# Patient Record
Sex: MALE | Race: WHITE | URBAN - METROPOLITAN AREA
[De-identification: names, ages, dates, MRNs, and addresses within clinical notes are randomized per-mention and may not be internally consistent; named-entity substitution may affect disease eponyms.]

---

## 2020-01-01 ENCOUNTER — TELEPHONE ENCOUNTER (OUTPATIENT)
Dept: URBAN - METROPOLITAN AREA CLINIC 92 | Facility: CLINIC | Age: 65
End: 2020-01-01

## 2020-01-01 ENCOUNTER — TELEPHONE ENCOUNTER (OUTPATIENT)
Dept: URBAN - METROPOLITAN AREA CLINIC 40 | Facility: CLINIC | Age: 65
End: 2020-01-01

## 2020-01-01 ENCOUNTER — OUT OF OFFICE VISIT (OUTPATIENT)
Dept: URBAN - METROPOLITAN AREA MEDICAL CENTER 30 | Facility: MEDICAL CENTER | Age: 65
End: 2020-01-01
Payer: COMMERCIAL

## 2020-01-01 ENCOUNTER — OFFICE VISIT (OUTPATIENT)
Dept: URBAN - METROPOLITAN AREA CLINIC 74 | Facility: CLINIC | Age: 65
End: 2020-01-01

## 2020-01-01 ENCOUNTER — OFFICE VISIT (OUTPATIENT)
Dept: URBAN - METROPOLITAN AREA CLINIC 39 | Facility: CLINIC | Age: 65
End: 2020-01-01

## 2020-01-01 ENCOUNTER — LAB OUTSIDE AN ENCOUNTER (OUTPATIENT)
Dept: URBAN - METROPOLITAN AREA CLINIC 74 | Facility: CLINIC | Age: 65
End: 2020-01-01

## 2020-01-01 ENCOUNTER — OFFICE VISIT (OUTPATIENT)
Dept: URBAN - METROPOLITAN AREA TELEHEALTH 2 | Facility: TELEHEALTH | Age: 65
End: 2020-01-01
Payer: COMMERCIAL

## 2020-01-01 ENCOUNTER — LAB OUTSIDE AN ENCOUNTER (OUTPATIENT)
Dept: URBAN - METROPOLITAN AREA CLINIC 40 | Facility: CLINIC | Age: 65
End: 2020-01-01

## 2020-01-01 ENCOUNTER — OFFICE VISIT (OUTPATIENT)
Dept: URBAN - METROPOLITAN AREA CLINIC 74 | Facility: CLINIC | Age: 65
End: 2020-01-01
Payer: COMMERCIAL

## 2020-01-01 ENCOUNTER — TELEPHONE ENCOUNTER (OUTPATIENT)
Dept: URBAN - METROPOLITAN AREA CLINIC 74 | Facility: CLINIC | Age: 65
End: 2020-01-01

## 2020-01-01 ENCOUNTER — OFFICE VISIT (OUTPATIENT)
Dept: URBAN - METROPOLITAN AREA CLINIC 40 | Facility: CLINIC | Age: 65
End: 2020-01-01
Payer: COMMERCIAL

## 2020-01-01 ENCOUNTER — WEB ENCOUNTER (OUTPATIENT)
Dept: URBAN - METROPOLITAN AREA CLINIC 74 | Facility: CLINIC | Age: 65
End: 2020-01-01

## 2020-01-01 DIAGNOSIS — I85.00 ESOPHAGEAL VARICES: ICD-10-CM

## 2020-01-01 DIAGNOSIS — K76.6 PORTAL HYPERTENSION: ICD-10-CM

## 2020-01-01 DIAGNOSIS — K57.90 DIVERTICULOSIS: ICD-10-CM

## 2020-01-01 DIAGNOSIS — K75.81 NASH (NONALCOHOLIC STEATOHEPATITIS): ICD-10-CM

## 2020-01-01 DIAGNOSIS — D64.89 ANEMIA DUE TO OTHER CAUSE: ICD-10-CM

## 2020-01-01 DIAGNOSIS — D50.9 FE DEFICIENCY ANEMIA: ICD-10-CM

## 2020-01-01 DIAGNOSIS — R18.8 ABDOMINAL FLUID COLLECTION: ICD-10-CM

## 2020-01-01 DIAGNOSIS — K92.1 HEMATOCHEZIA: ICD-10-CM

## 2020-01-01 DIAGNOSIS — B95.62 METHICILLIN RESISTANT STAPHYLOCOCCUS AUREUS INFECTION AS THE CAUSE OF DISEASES CLASSIFIED ELSEWHERE: ICD-10-CM

## 2020-01-01 DIAGNOSIS — K31.819 ANGIODYSPLASIA OF DUODENUM: ICD-10-CM

## 2020-01-01 DIAGNOSIS — K74.69 OTHER CIRRHOSIS OF LIVER: ICD-10-CM

## 2020-01-01 DIAGNOSIS — K31.9 GASTROPATHY: ICD-10-CM

## 2020-01-01 DIAGNOSIS — K57.30 COLON, DIVERTICULOSIS: ICD-10-CM

## 2020-01-01 DIAGNOSIS — K74.69 CIRRHOSIS, CRYPTOGENIC: ICD-10-CM

## 2020-01-01 DIAGNOSIS — K29.70 GASTRITIS: ICD-10-CM

## 2020-01-01 DIAGNOSIS — K74.60 CIRRHOSIS, NONALCOHOLIC: ICD-10-CM

## 2020-01-01 DIAGNOSIS — K72.90 ENCEPHALOPATHY, HEPATIC: ICD-10-CM

## 2020-01-01 DIAGNOSIS — Z12.11 COLON CANCER SCREENING: ICD-10-CM

## 2020-01-01 DIAGNOSIS — R16.2 HEPATOSPLENOMEGALY: ICD-10-CM

## 2020-01-01 DIAGNOSIS — R18.8 ASCITES: ICD-10-CM

## 2020-01-01 DIAGNOSIS — K31.819 GAVE (GASTRIC ANTRAL VASCULAR ECTASIA): ICD-10-CM

## 2020-01-01 DIAGNOSIS — K21.9 GERD: ICD-10-CM

## 2020-01-01 DIAGNOSIS — R18.8 OTHER ASCITES: ICD-10-CM

## 2020-01-01 DIAGNOSIS — D50.9 ANEMIA, IRON DEFICIENCY: ICD-10-CM

## 2020-01-01 DIAGNOSIS — I85.10 SECONDARY ESOPHAGEAL VARICES WITHOUT BLEEDING: ICD-10-CM

## 2020-01-01 DIAGNOSIS — R16.0 HEPATOMEGALY: ICD-10-CM

## 2020-01-01 DIAGNOSIS — D46.9 MDS (MYELODYSPLASTIC SYNDROME): ICD-10-CM

## 2020-01-01 DIAGNOSIS — D63.8 ANEMIA OF CHRONIC DISEASE: ICD-10-CM

## 2020-01-01 DIAGNOSIS — E87.1 HYPONATREMIA: ICD-10-CM

## 2020-01-01 DIAGNOSIS — R19.5 ABNORMAL FECES: ICD-10-CM

## 2020-01-01 DIAGNOSIS — R16.1 SPLENOMEGALY: ICD-10-CM

## 2020-01-01 LAB
A/G RATIO: 1
ALBUMIN: 2.3
ALKALINE PHOSPHATASE: 126
ALT (SGPT): 15
AMMONIA, PLASMA: 69
AST (SGOT): 32
BASO (ABSOLUTE): 0
BASOS: 0
BILIRUBIN, TOTAL: 0.8
BUN/CREATININE RATIO: 16
BUN: 28
CALCIUM: 7.2
CARBON DIOXIDE, TOTAL: 21
CHLORIDE: 87
CREATININE: 1.7
EGFR IF AFRICN AM: 49
EGFR IF NONAFRICN AM: 42
EOS (ABSOLUTE): 0.1
EOS: 1
GLOBULIN, TOTAL: 2.2
GLUCOSE: 282
HEMATOCRIT: 15
HEMATOLOGY COMMENTS:: (no result)
HEMOGLOBIN: 4.6
IMMATURE CELLS: (no result)
IMMATURE GRANS (ABS): 0
IMMATURE GRANULOCYTES: 1
LYMPHS (ABSOLUTE): 0.5
LYMPHS: 6
MCH: 27.1
MCHC: 30.7
MCV: 88
MONOCYTES(ABSOLUTE): 0.7
MONOCYTES: 10
NEUTROPHILS (ABSOLUTE): 6.2
NEUTROPHILS: 82
NRBC: (no result)
PLATELETS: 119
POTASSIUM: 4.1
PROTEIN, TOTAL: 4.5
RBC: 1.7
RDW: 14.9
SODIUM: 122
WBC: 7.6

## 2020-01-01 PROCEDURE — 43235 EGD DIAGNOSTIC BRUSH WASH: CPT | Performed by: INTERNAL MEDICINE

## 2020-01-01 PROCEDURE — 3017F COLORECTAL CA SCREEN DOC REV: CPT | Performed by: INTERNAL MEDICINE

## 2020-01-01 PROCEDURE — 99214 OFFICE O/P EST MOD 30 MIN: CPT | Performed by: INTERNAL MEDICINE

## 2020-01-01 PROCEDURE — G8427 DOCREV CUR MEDS BY ELIG CLIN: HCPCS | Performed by: INTERNAL MEDICINE

## 2020-01-01 PROCEDURE — G8482 FLU IMMUNIZE ORDER/ADMIN: HCPCS | Performed by: INTERNAL MEDICINE

## 2020-01-01 PROCEDURE — 99233 SBSQ HOSP IP/OBS HIGH 50: CPT | Performed by: INTERNAL MEDICINE

## 2020-01-01 PROCEDURE — G9903 PT SCRN TBCO ID AS NON USER: HCPCS | Performed by: INTERNAL MEDICINE

## 2020-01-01 PROCEDURE — 99215 OFFICE O/P EST HI 40 MIN: CPT | Performed by: INTERNAL MEDICINE

## 2020-01-01 PROCEDURE — G8417 CALC BMI ABV UP PARAM F/U: HCPCS | Performed by: INTERNAL MEDICINE

## 2020-01-01 PROCEDURE — 99232 SBSQ HOSP IP/OBS MODERATE 35: CPT | Performed by: INTERNAL MEDICINE

## 2020-01-01 PROCEDURE — 992 APS NON BILLABLE: Performed by: PHYSICIAN ASSISTANT

## 2020-01-01 PROCEDURE — 1036F TOBACCO NON-USER: CPT | Performed by: INTERNAL MEDICINE

## 2020-01-01 PROCEDURE — 99222 1ST HOSP IP/OBS MODERATE 55: CPT | Performed by: INTERNAL MEDICINE

## 2020-01-01 PROCEDURE — 99223 1ST HOSP IP/OBS HIGH 75: CPT | Performed by: INTERNAL MEDICINE

## 2020-01-01 PROCEDURE — 43270 EGD LESION ABLATION: CPT | Performed by: INTERNAL MEDICINE

## 2020-01-01 RX ORDER — POTASSIUM CHLORIDE 750 MG/1
TABLET, EXTENDED RELEASE ORAL
Qty: 0 | Refills: 0 | Status: ACTIVE | COMMUNITY
Start: 1900-01-01

## 2020-01-01 RX ORDER — ROSUVASTATIN CALCIUM 10 MG/1
TABLET, FILM COATED ORAL
Qty: 0 | Refills: 0 | Status: ACTIVE | COMMUNITY
Start: 1900-01-01

## 2020-01-01 RX ORDER — LEVOTHYROXINE SODIUM 75 UG/1
TAKE 1 TABLET (75 MCG) BY ORAL ROUTE ONCE DAILY TABLET ORAL 1
Qty: 0 | Refills: 0 | Status: ACTIVE | COMMUNITY
Start: 1900-01-01

## 2020-01-01 RX ORDER — GABAPENTIN 300 MG/1
TAKE 1 CAPSULE BY ORAL ROUTE DAILY CAPSULE ORAL ONCE A DAY
Qty: 30 CAPSULE | Refills: 2 | Status: ACTIVE | COMMUNITY

## 2020-01-01 RX ORDER — CIPROFLOXACIN HCL 500 MG
1 TABLET TABLET ORAL QD
Qty: 30 TABLET | Refills: 3 | Status: ACTIVE | COMMUNITY
Start: 2020-01-01 | End: 2020-01-01

## 2020-01-01 RX ORDER — LACTULOSE 10 G/15ML
30 ML SOLUTION ORAL TWICE DAILY
Qty: 1800 MILLILITER | Refills: 6 | Status: ACTIVE | COMMUNITY
Start: 2020-01-01 | End: 2020-01-01

## 2020-01-01 RX ORDER — LANSOPRAZOLE 30 MG/1
TAKE 1 CAPSULE (30 MG) BY ORAL ROUTE ONCE DAILY BEFORE A MEAL FOR 30 DAYS CAPSULE, DELAYED RELEASE ORAL 1
Qty: 30 | Refills: 11 | Status: ACTIVE | COMMUNITY
Start: 2020-01-21 | End: 2021-01-01

## 2020-01-01 RX ORDER — GABAPENTIN 300 MG/1
TAKE 1 CAPSULE BY ORAL ROUTE DAILY CAPSULE ORAL 1
Qty: 0 | Refills: 0 | Status: ACTIVE | COMMUNITY
Start: 1900-01-01

## 2020-01-01 RX ORDER — BUMETANIDE 1 MG/1
TAKE 1 TABLET (1 MG) BY ORAL ROUTE ONCE DAILY TABLET ORAL 1
Qty: 0 | Refills: 0 | Status: ACTIVE | COMMUNITY
Start: 1900-01-01

## 2020-01-01 RX ORDER — POTASSIUM CHLORIDE 750 MG/1
TABLET, EXTENDED RELEASE ORAL
Qty: 0 | Refills: 0 | Status: ACTIVE | COMMUNITY
Start: 1900-01-01 | End: 1900-01-01

## 2020-01-01 RX ORDER — CIPROFLOXACIN HCL 500 MG
1 TABLET TABLET ORAL QD
Qty: 90 TABLET | Refills: 3 | Status: ACTIVE | COMMUNITY
Start: 2020-01-01 | End: 2021-06-10

## 2020-01-01 RX ORDER — ASPIRIN 325 MG/1
TAKE 1 TABLET (325 MG) BY ORAL ROUTE ONCE DAILY TABLET ORAL 1
Qty: 0 | Refills: 0 | Status: DISCONTINUED | COMMUNITY
Start: 1900-01-01

## 2020-01-01 RX ORDER — ROSUVASTATIN CALCIUM 10 MG/1
TABLET, FILM COATED ORAL
OUTPATIENT
Start: 1900-01-01

## 2020-01-01 RX ORDER — ASPIRIN 325 MG/1
TAKE 1 TABLET (325 MG) BY ORAL ROUTE ONCE DAILY TABLET ORAL 1
Qty: 0 | Refills: 0 | Status: ACTIVE | COMMUNITY
Start: 1900-01-01

## 2020-01-01 RX ORDER — NADOLOL 20 MG/1
TAKE 1 TABLET (20 MG) BY ORAL ROUTE ONCE DAILY FOR 30 DAYS TABLET ORAL 1
Qty: 30 | Refills: 5 | Status: ACTIVE | COMMUNITY
Start: 2020-01-01 | End: 2020-01-01

## 2020-01-01 RX ORDER — GLIMEPIRIDE 4 MG/1
TAKE 1 TABLET (4 MG) BY ORAL ROUTE ONCE DAILY TABLET ORAL 1
Qty: 0 | Refills: 0 | Status: ACTIVE | COMMUNITY
Start: 1900-01-01

## 2020-01-01 RX ORDER — TRAMADOL HYDROCHLORIDE 50 MG/1
TABLET ORAL
Qty: 0 | Refills: 0 | Status: ACTIVE | COMMUNITY
Start: 1900-01-01

## 2020-01-01 RX ORDER — CIPROFLOXACIN HCL 500 MG
1 TABLET TABLET ORAL QD
Qty: 90 TABLET | Refills: 3

## 2020-01-01 RX ORDER — MIDODRINE HYDROCHLORIDE 10 MG/1
TABLET ORAL
Qty: 90 DELAYED RELEASE TABLET | Refills: 1 | Status: ACTIVE | COMMUNITY

## 2020-01-01 RX ORDER — SPIRONOLACTONE 100 MG/1
TAKE 1 TABLET (100 MG) BY ORAL ROUTE ONCE DAILY FOR 30 DAYS TABLET, FILM COATED ORAL 1
Qty: 30 | Refills: 3 | Status: ACTIVE | COMMUNITY
Start: 2020-01-01 | End: 2020-01-01

## 2020-01-01 RX ORDER — SULFAMETHOXAZOLE AND TRIMETHOPRIM 800; 160 MG/1; MG/1
1 TABLET TABLET ORAL DAILY
Qty: 90 TABLET | Refills: 3 | OUTPATIENT
Start: 2020-01-01 | End: 2021-06-04

## 2020-01-01 RX ORDER — TRAMADOL HYDROCHLORIDE 50 MG/1
TABLET ORAL
Qty: 0 | Refills: 0 | Status: ACTIVE | COMMUNITY
Start: 1900-01-01 | End: 1900-01-01

## 2020-01-01 RX ORDER — SULFAMETHOXAZOLE AND TRIMETHOPRIM 800; 160 MG/1; MG/1
1 TABLET TABLET ORAL DAILY
Qty: 90 TABLET | Refills: 3 | Status: DISCONTINUED | COMMUNITY
Start: 2020-01-01 | End: 2021-06-04

## 2020-01-01 RX ORDER — LACTULOSE 10 G/15ML
TAKE 15 MILLILITERS BY ORAL ROUTE 2 TIMES A DAY FOR 30 DAYS SOLUTION ORAL 2
Qty: 900 | Refills: 4 | Status: ACTIVE | COMMUNITY
Start: 2020-01-01 | End: 2020-01-01

## 2020-01-01 RX ORDER — RIFAXIMIN 550 MG/1
1 TABLET TABLET ORAL TWICE A DAY
Qty: 180 TABLET | Refills: 3 | Status: ACTIVE | COMMUNITY

## 2020-01-01 RX ORDER — LEVOTHYROXINE SODIUM 75 UG/1
TAKE 1 TABLET (75 MCG) BY ORAL ROUTE ONCE DAILY TABLET ORAL 1
Qty: 0 | Refills: 0 | Status: ACTIVE | COMMUNITY
Start: 1900-01-01 | End: 1900-01-01

## 2020-01-01 RX ORDER — RIFAXIMIN 550 MG/1
1 TABLET TABLET ORAL TWICE A DAY
Status: ACTIVE | COMMUNITY

## 2020-01-01 RX ORDER — RIFAXIMIN 550 MG/1
1 TABLET TABLET ORAL TWICE A DAY
Qty: 180 TABLET | Refills: 3

## 2020-01-01 RX ORDER — NADOLOL 20 MG/1
TAKE 1 TABLET (20 MG) BY ORAL ROUTE ONCE DAILY FOR 30 DAYS TABLET ORAL 1
OUTPATIENT
Start: 2020-01-01 | End: 2020-01-01

## 2020-01-01 RX ORDER — ASPIRIN 325 MG/1
TAKE 1 TABLET (325 MG) BY ORAL ROUTE ONCE DAILY TABLET ORAL 1
Qty: 0 | Refills: 0 | Status: ACTIVE | COMMUNITY
Start: 1900-01-01 | End: 1900-01-01

## 2020-01-01 RX ORDER — LACTULOSE 10 G/15ML
30 ML SOLUTION ORAL TWICE DAILY
Qty: 1800 MILLILITER | Refills: 6
Start: 2020-01-01 | End: 2020-01-01

## 2020-01-01 RX ORDER — SULFAMETHOXAZOLE AND TRIMETHOPRIM 800; 160 MG/1; MG/1
1 TABLET TABLET ORAL DAILY
OUTPATIENT
Start: 2020-01-01 | End: 2021-06-04

## 2020-01-01 RX ORDER — BUMETANIDE 1 MG/1
TAKE 1 TABLET (1 MG) BY ORAL ROUTE ONCE DAILY TABLET ORAL 1
Qty: 0 | Refills: 0 | Status: ACTIVE | COMMUNITY
Start: 1900-01-01 | End: 1900-01-01

## 2020-01-01 RX ORDER — GABAPENTIN 300 MG/1
TAKE 1 CAPSULE BY ORAL ROUTE DAILY CAPSULE ORAL 1
Qty: 0 | Refills: 0 | Status: ACTIVE | COMMUNITY
Start: 1900-01-01 | End: 1900-01-01

## 2020-01-01 RX ORDER — GABAPENTIN 300 MG/1
TAKE 1 CAPSULE BY ORAL ROUTE DAILY CAPSULE ORAL ONCE A DAY
Qty: 30 CAPSULE | Refills: 2

## 2020-01-01 RX ORDER — CIPROFLOXACIN HCL 500 MG
1 TABLET TABLET ORAL QD
Qty: 90 TABLET | Refills: 3
Start: 2020-01-01 | End: 2021-06-10

## 2020-01-01 RX ORDER — ALBUTEROL SULFATE 90 UG/1
1 PUFF AS NEEDED AEROSOL, METERED RESPIRATORY (INHALATION)
Qty: 1 KIT | Refills: 2 | OUTPATIENT

## 2020-01-01 RX ORDER — CIPROFLOXACIN HCL 500 MG
1 TABLET TABLET ORAL QD
Qty: 30 TABLET | Refills: 3 | OUTPATIENT
Start: 2020-01-01 | End: 2020-01-01

## 2020-01-01 RX ORDER — GLIMEPIRIDE 4 MG/1
TAKE 1 TABLET (4 MG) BY ORAL ROUTE ONCE DAILY TABLET ORAL 1
Qty: 0 | Refills: 0 | Status: ACTIVE | COMMUNITY
Start: 1900-01-01 | End: 1900-01-01

## 2020-01-01 RX ORDER — ROSUVASTATIN CALCIUM 10 MG/1
TABLET, FILM COATED ORAL
Qty: 0 | Refills: 0 | Status: ACTIVE | COMMUNITY
Start: 1900-01-01 | End: 1900-01-01

## 2020-07-06 NOTE — HPI-TODAY'S VISIT:
This is a scheduled follow-up appointment for this patient, a 65 year old /White male, after a previous visit last month for an evaluation of cirrhosis, elevated liver function tests, and anemia of chronic disease. A copy of this document will be sent to the referring provider. Recent liver function tests, AST, ALT, and, are elevated (see labs which were reviewed in the patients records ). The patient does have an established diagnosis of cirrhosis. They have been cirrhotic for 1 year. They report ascites, peripheral edema, esophageal varices, fatigue, and weakness. The ascites has been present for 6 months. Their ascites is moderate with a fair amount of fluid in their abdomen. The ascites is worsening. Needs LVP every 2-3 weeks. The edema is present in entire lower extremity. The edema is mild. They have a history of varices which are located in the esophagus. They do not report a history of variceal bleeding. They are taking Nadolol for control of the varices . They have an EGD. The patient was seen by Dr. 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 He has had blood transfusion and Fe infusion. He has also seen Dr. Mcgee, Nephrologist and he is now on bumex/aldactone. uses bumex prn. afraid it would make his creatinine worse. He is currently on Ferrous sulfate 65 mg twice daily. His stool is dark due to Fe supplement. The patient relates no significant family or personal history of liver disease. He states no history of new medications or alcohol use. The patient reports a personal history of no other habits of significance. Interval testing includes: Hepatitis Profile, Liver function studies, Hepatobiliary scan, Abdominal CT scan, Paracentesis, Iron studies, lipase, and abdominal ultrasound. Hepatitis profile fails to confirm findings consistent with Hepatitis A, Hepatitis B, and Hepatitis C. he was admitted to the hospital few months ago. was diagnosed with SBP while at the hospital had 6.7 l of fluid removed. tested negative for Covid. nlast tap a week ago. no SBP per fluid analysis. he is on cipro for SBP prophylaxis. stopped bactrim due to renal insufficiency. son is a heart transplant recepient. Given MDS diagnosis-not a candidate per [place] hepatology. wants to get second opinion about liver transplant from [place]. I have discussed with  and he will be evaluated for transplant. might benefit from TIPS. 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 needing periodic blood transfusions. hb today 7.2. no significant encephalopathy now. has had it mildly in the past. on low dose nadolol now due to bradycardia. MELD-11

## 2020-07-06 NOTE — PHYSICAL EXAM GASTROINTESTINAL
Abdomen , soft, nontender, moderate ascites/fluid thrill/caput medusae , no guarding or rigidity , no masses palpable , normal bowel sounds , Liver and Spleen , no hepatomegaly present , no hepatosplenomegaly , liver nontender , spleen not palpable

## 2020-07-20 NOTE — HPI-TODAY'S VISIT:
This is a  follow-up appointment for this patient, a 65 year old /White male, after a previous visit last month for an evaluation of cirrhosis, elevated liver function tests, and anemia of chronic disease. A copy of this document will be sent to the referring provider. Recent liver function tests, AST, ALT, and, are elevated (see labs which were reviewed in the patients records ). The patient does have an established diagnosis of cirrhosis. They have been cirrhotic for 1 year. They report ascites, peripheral edema, esophageal varices, fatigue, and weakness. The ascites has been present for 6 months. Their ascites is severe with a fair amount of fluid in their abdomen. The ascites is worsening. Needs LVP every 2. last LVP was 1 week ago. removed 9 litres. The edema is present in entire lower extremity. The edema is mild. They have a history of varices which are located in the esophagus. They do not report a history of variceal bleeding. They are taking Nadolol for control of the varices/GAVE . They have an EGD. The patient was seen by Dr. 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 He has had multiple blood transfusions and Fe infusion. gets transfused every 2 weeks. He has also seen Dr. Mcgee, Nephrologist and he is now on bumex/aldactone. uses bumex prn. afraid it would make his creatinine worse. He is currently on Ferrous sulfate 65 mg twice daily. His stool is dark due to Fe supplement. The patient relates no significant family or personal history of liver disease. He states no history of new medications or alcohol use. The patient reports a personal history of no other habits of significance. Interval testing includes: Hepatitis Profile, Liver function studies, Hepatobiliary scan, Abdominal CT scan, Paracentesis, Iron studies, lipase, and abdominal ultrasound. Hepatitis profile fails to confirm findings consistent with Hepatitis A, Hepatitis B, and Hepatitis C. he was admitted to the hospital few months ago. was diagnosed with SBP while at the hospital had 6.7 l of fluid removed. tested negative for Covid. last tap a week ago. no SBP per fluid analysis. he is on cipro for SBP prophylaxis. stopped bactrim due to renal insufficiency. son is a heart transplant recepient. Given MDS diagnosis-not a candidate per [place] hepatology. wants to get second opinion about liver transplant from [place]. I have discussed with  and he will be evaluated for transplant next week as OP. might benefit from TIPS. 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 needing periodic blood transfusions. MELD-11. he is feeling much worse after tap last week. he is hypotensive. SBP in 80s. advised to stop nadolol. having more confusion/flapping tremors. feels very tired. significant fluid build up again. creatinine worsens when diuretics are increased. so is only on low dose aldactone. fell down last week. no head injury. he is compliant with lactulose/xifaxan

## 2020-07-20 NOTE — PHYSICAL EXAM NEUROLOGIC:
oriented to person, place and time , normal sensation , lethargic, memory loss noted, flapping tremors noted

## 2020-08-06 NOTE — PHYSICAL EXAM GASTROINTESTINAL
Abdomen , soft, nontender, ascites has improved a lot, no guarding or rigidity , no masses palpable , normal bowel sounds , Liver and Spleen , no hepatomegaly present , no hepatosplenomegaly , liver nontender , spleen not palpable

## 2020-08-06 NOTE — HPI-TODAY'S VISIT:
This is a  follow-up appointment for this patient, a 65 year old /White male, after a previous visit last month for an evaluation of cirrhosis, elevated liver function tests, and anemia of chronic disease. A copy of this document will be sent to the referring provider. Recent liver function tests, AST, ALT, and, are elevated (see labs which were reviewed in the patients records ). The patient does have an established diagnosis of cirrhosis. They have been cirrhotic for 1 year. They report ascites, peripheral edema, esophageal varices, fatigue, and weakness. The ascites has been present for year. Ascites is much better after TIPS. has not needed tap since then. The edema is mild. They have a history of varices which are located in the esophagus. They do not report a history of variceal bleeding. They are taking Nadolol for control of the varices/GAVE . They have an EGD. The patient was seen by Dr. 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 He has had multiple blood transfusions and Fe infusion. gets transfused every 2 weeks. He has also seen Dr. Mcgee, Nephrologist and he is now on bumex/aldactone. uses bumex prn. afraid it would make his creatinine worse. He is currently on Ferrous sulfate 65 mg twice daily. His stool is dark due to Fe supplement. The patient relates no significant family or personal history of liver disease. He states no history of new medications or alcohol use. The patient reports a personal history of no other habits of significance. Interval testing includes: Hepatitis Profile, Liver function studies, Hepatobiliary scan, Abdominal CT scan, Paracentesis, Iron studies, lipase, and abdominal ultrasound. Hepatitis profile fails to confirm findings consistent with Hepatitis A, Hepatitis B, and Hepatitis C. he was admitted to the hospital few months ago. was diagnosed with SBP while at the hospital had 6.7 l of fluid removed. tested negative for Covid. last tap a week ago. no SBP per fluid analysis. he is on cipro for SBP prophylaxis. stopped bactrim due to renal insufficiency. son is a heart transplant recepient. Given MDS diagnosis-not a candidate per [place] hepatology.  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 needing periodic blood transfusions. MELD-11. advised to stop nadolol. mentation is better on lactulose/xifaxan. feels very tired. feels much better since getting TIPS done at [place]. creatinine worsens when diuretics are increased. so is only on low dose aldactone.  he is compliant with lactulose/xifaxan. went to ER last week for dysuria. no infection was found. na-128. hb-8.4. started on some medicationto improve BP. now in the 140s. was in 80s in the past

## 2020-09-03 NOTE — PHYSICAL EXAM GASTROINTESTINAL
Abdomen , soft, nontender, ascites has improved a lot, no guarding or rigidity , no masses palpable , normal bowel sounds

## 2020-09-03 NOTE — HPI-TODAY'S VISIT:
This is a  follow-up appointment for this patient, a 65 year old /White male, after a previous visit last month for an evaluation of cirrhosis, elevated liver function tests, and anemia of chronic disease. A copy of this document will be sent to the referring provider. Recent liver function tests, AST, ALT, and, are elevated (see labs which were reviewed in the patients records ). The patient does have an established diagnosis of cirrhosis. They have been cirrhotic for 1 year. They report ascites, peripheral edema, esophageal varices, fatigue, and weakness. The ascites has been present for year. Ascites is much better after TIPS. has not needed tap since then. The edema is moderate-seems a little worse after TIPS. They have a history of varices which are located in the esophagus. They do not report a history of variceal bleeding. They have an EGD. The patient was seen by Dr. 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 He has had multiple blood transfusions and Fe infusion. gets transfused every 2 weeks. He has also seen Dr. Mcgee, Nephrologist and he is now on bumex/aldactone. uses bumex prn. afraid it would make his creatinine worse. He is currently on Ferrous sulfate 65 mg twice daily. His stool is dark due to Fe supplement. The patient relates no significant family or personal history of liver disease. He states no history of new medications or alcohol use. The patient reports a personal history of no other habits of significance. Interval testing includes: Hepatitis Profile, Liver function studies, Hepatobiliary scan, Abdominal CT scan, Paracentesis, Iron studies, lipase, and abdominal ultrasound. Hepatitis profile fails to confirm findings consistent with Hepatitis A, Hepatitis B, and Hepatitis C. he was admitted to the hospital few months ago. was diagnosed with SBP while at the hospital. He is on cipro for SBP prophylaxis. stopped bactrim due to renal insufficiency. son is a heart transplant recepient. Given MDS diagnosis-not a candidate per [place] hepatology.  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 needing periodic blood transfusions. MELD-11. has stopped nadolol-BP is much beter after that. mentation is better on lactulose/xifaxan. feels very tired. feels much better since getting TIPS done at [place]. creatinine worsens when diuretics are increased. so is only on low dose aldactone.  he is compliant with lactulose/xifaxan. Na-128. hb-8.4. started on some medicationts to improve BP.

## 2020-10-09 NOTE — HPI-TODAY'S VISIT:
This is a  follow-up appointment for this patient, a 65 year old /White male, after a previous visit last month for an evaluation of cirrhosis, elevated liver function tests, and anemia of chronic disease. A copy of this document will be sent to the referring provider. Recent liver function tests, AST, ALT, and, are elevated (see labs which were reviewed in the patients records ). The patient does have an established diagnosis of cirrhosis. They have been cirrhotic for 1 year. They report ascites, peripheral edema, esophageal varices, fatigue, and weakness. The ascites has been present for year. ascites had responsed to TIPS initially, but has worsened now. needing LVP weekly again.almost 7 liters removed yesterday.  The edema is moderate-seems a little worse after TIPS. They have a history of varices which are located in the esophagus. They do not report a history of variceal bleeding. They have an EGD. The patient was seen by Dr. 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 He has had multiple blood transfusions and Fe infusion.  He has also seen Dr. Mcgee, Nephrologist and he is now on bumex. aldactone was stopped.  uses bumex prn. afraid it would make his creatinine worse. He is currently on Ferrous sulfate 65 mg twice daily. His stool is dark due to Fe supplement. The patient relates no significant family or personal history of liver disease. He states no history of new medications or alcohol use.  he was admitted to the hospital few months ago. was diagnosed with SBP while at the hospital. He is on cipro for SBP prophylaxis. stopped bactrim due to renal insufficiency. son is a heart transplant recepient. Given MDS diagnosis-not a candidate per [place] hepatology.  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 needing periodic blood transfusions. MELD-11. has stopped nadolol-BP is much beter after that.  feels very tired. sleeps a lot. not able to walk much. needs wheelchair. falls down even with walker at times. creatinine worsens when diuretics are increased.  he is compliant with lactulose. not on xifaxan. on midodrine. BP is borderline today

## 2020-10-09 NOTE — PHYSICAL EXAM GASTROINTESTINAL
Abdomen , soft, nontender, fluid thrill present, no guarding or rigidity , no masses palpable , normal bowel sounds

## 2020-10-28 NOTE — HPI-TODAY'S VISIT:
This is a  follow-up appointment for this patient, a 65 year old /White male, after a previous visit last month for an evaluation of cirrhosis, elevated liver function tests, and anemia of chronic disease. A copy of this document will be sent to the referring provider. Recent liver function tests, AST, ALT, and, are elevated (see labs which were reviewed in the patients records ). The patient does have an established diagnosis of cirrhosis. They have been cirrhotic for 1 year. They report ascites, peripheral edema, esophageal varices, fatigue, and weakness. The ascites has been present for year. ascites had responsed to TIPS initially, but has worsened now. needing LVP weekly again.almost 9 liters removed last week.  The edema is moderate-seems a little worse after TIPS. They have a history of varices which are located in the esophagus. They do not report a history of variceal bleeding. They have an EGD. The patient was seen by Dr. 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 He has had multiple blood transfusions and Fe infusion.  He has also seen Dr. Mcgee, Nephrologist and he is now on bumex. aldactone was stopped.  uses bumex prn. afraid it would make his creatinine worse. He is currently on Ferrous sulfate 65 mg twice daily. His stool is dark due to Fe supplement. The patient relates no significant family or personal history of liver disease. He states no history of new medications or alcohol use.  he was admitted to the hospital few months ago. was diagnosed with SBP while at the hospital. He is on cipro for SBP prophylaxis. stopped bactrim due to renal insufficiency. son is a heart transplant recepient. Given MDS diagnosis-not a candidate per [place] hepatology.  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 needing periodic blood transfusions. MELD-11. has stopped nadolol-BP is much beter after that.  needs wheelchair. falls down even with walker at times. feels btter since starting on lactulose/xifaxan. creatinine worsens when diuretics are increased.  he is compliant with lactulose. on midodrine. was admitted to the hospital with hb of 4. Had APC done of GAVE. recent hb was 7.1. being followed closely by . televisit today

## 2021-01-01 ENCOUNTER — TELEPHONE ENCOUNTER (OUTPATIENT)
Dept: URBAN - METROPOLITAN AREA CLINIC 92 | Facility: CLINIC | Age: 66
End: 2021-01-01

## 2021-01-01 ENCOUNTER — LAB OUTSIDE AN ENCOUNTER (OUTPATIENT)
Dept: URBAN - METROPOLITAN AREA CLINIC 40 | Facility: CLINIC | Age: 66
End: 2021-01-01

## 2021-01-01 ENCOUNTER — OUT OF OFFICE VISIT (OUTPATIENT)
Dept: URBAN - METROPOLITAN AREA MEDICAL CENTER 30 | Facility: MEDICAL CENTER | Age: 66
End: 2021-01-01
Payer: COMMERCIAL

## 2021-01-01 ENCOUNTER — ERX REFILL RESPONSE (OUTPATIENT)
Dept: URBAN - METROPOLITAN AREA CLINIC 40 | Facility: CLINIC | Age: 66
End: 2021-01-01

## 2021-01-01 ENCOUNTER — TELEPHONE ENCOUNTER (OUTPATIENT)
Dept: URBAN - METROPOLITAN AREA CLINIC 40 | Facility: CLINIC | Age: 66
End: 2021-01-01

## 2021-01-01 ENCOUNTER — DASHBOARD ENCOUNTERS (OUTPATIENT)
Age: 66
End: 2021-01-01

## 2021-01-01 ENCOUNTER — OFFICE VISIT (OUTPATIENT)
Dept: URBAN - METROPOLITAN AREA TELEHEALTH 2 | Facility: TELEHEALTH | Age: 66
End: 2021-01-01
Payer: COMMERCIAL

## 2021-01-01 DIAGNOSIS — K75.81 NASH (NONALCOHOLIC STEATOHEPATITIS): ICD-10-CM

## 2021-01-01 DIAGNOSIS — I85.00 ESOPHAGEAL VARICES: ICD-10-CM

## 2021-01-01 DIAGNOSIS — E87.1 ACUTE HYPONATREMIA: ICD-10-CM

## 2021-01-01 DIAGNOSIS — K74.60 ADVANCED CIRRHOSIS OF LIVER: ICD-10-CM

## 2021-01-01 DIAGNOSIS — K74.60 CIRRHOSIS, NONALCOHOLIC: ICD-10-CM

## 2021-01-01 DIAGNOSIS — R18.8 OTHER ASCITES: ICD-10-CM

## 2021-01-01 DIAGNOSIS — K74.69 CIRRHOSIS, CRYPTOGENIC: ICD-10-CM

## 2021-01-01 DIAGNOSIS — D62 ACUTE BLOOD LOSS ANEMIA: ICD-10-CM

## 2021-01-01 DIAGNOSIS — I95.9 HYPOTENSION: ICD-10-CM

## 2021-01-01 DIAGNOSIS — K92.2 ACUTE GASTROINTESTINAL BLEEDING: ICD-10-CM

## 2021-01-01 DIAGNOSIS — K76.6 PORTAL HYPERTENSION: ICD-10-CM

## 2021-01-01 DIAGNOSIS — K57.90 DIVERTICULOSIS: ICD-10-CM

## 2021-01-01 DIAGNOSIS — R16.1 SPLENOMEGALY: ICD-10-CM

## 2021-01-01 DIAGNOSIS — K92.1 HEMATOCHEZIA: ICD-10-CM

## 2021-01-01 DIAGNOSIS — D50.9 FE DEFICIENCY ANEMIA: ICD-10-CM

## 2021-01-01 DIAGNOSIS — D63.8 ANEMIA OF CHRONIC DISEASE: ICD-10-CM

## 2021-01-01 DIAGNOSIS — N28.9 ACUTE RENAL INSUFFICIENCY: ICD-10-CM

## 2021-01-01 PROCEDURE — 99223 1ST HOSP IP/OBS HIGH 75: CPT | Performed by: INTERNAL MEDICINE

## 2021-01-01 PROCEDURE — 99443 PHONE E/M BY PHYS 21-30 MIN: CPT | Performed by: INTERNAL MEDICINE

## 2021-01-01 PROCEDURE — 99233 SBSQ HOSP IP/OBS HIGH 50: CPT | Performed by: INTERNAL MEDICINE

## 2021-01-01 PROCEDURE — 99232 SBSQ HOSP IP/OBS MODERATE 35: CPT | Performed by: INTERNAL MEDICINE

## 2021-01-01 PROCEDURE — G8427 DOCREV CUR MEDS BY ELIG CLIN: HCPCS | Performed by: INTERNAL MEDICINE

## 2021-01-01 RX ORDER — RIFAXIMIN 550 MG/1
1 TABLET TABLET ORAL TWICE A DAY
Qty: 180 TABLET | Refills: 3 | Status: ACTIVE | COMMUNITY

## 2021-01-01 RX ORDER — TRAMADOL HYDROCHLORIDE 50 MG/1
TABLET ORAL
Qty: 0 | Refills: 0 | Status: ACTIVE | COMMUNITY
Start: 1900-01-01

## 2021-01-01 RX ORDER — BUMETANIDE 1 MG/1
TAKE 1 TABLET (1 MG) BY ORAL ROUTE ONCE DAILY TABLET ORAL 1
Qty: 0 | Refills: 0 | Status: ACTIVE | COMMUNITY
Start: 1900-01-01

## 2021-01-01 RX ORDER — ALBUTEROL SULFATE 90 UG/1
1 PUFF AS NEEDED AEROSOL, METERED RESPIRATORY (INHALATION)
Qty: 1 KIT | Refills: 2 | Status: ACTIVE | COMMUNITY

## 2021-01-01 RX ORDER — GABAPENTIN 300 MG/1
TAKE 1 CAPSULE BY ORAL ROUTE DAILY CAPSULE ORAL ONCE A DAY
Qty: 30 | Refills: 1

## 2021-01-01 RX ORDER — ALBUTEROL SULFATE 90 UG/1
1 PUFF AS NEEDED AEROSOL, METERED RESPIRATORY (INHALATION)
Qty: 1 KIT | Refills: 2 | OUTPATIENT

## 2021-01-01 RX ORDER — CIPROFLOXACIN HCL 500 MG
1 TABLET TABLET ORAL QD
Qty: 90 TABLET | Refills: 3

## 2021-01-01 RX ORDER — POTASSIUM CHLORIDE 750 MG/1
TABLET, EXTENDED RELEASE ORAL
Qty: 0 | Refills: 0 | Status: ON HOLD | COMMUNITY
Start: 1900-01-01

## 2021-01-01 RX ORDER — MIDODRINE HYDROCHLORIDE 10 MG/1
TABLET ORAL
Qty: 90 DELAYED RELEASE TABLET | Refills: 1 | Status: ACTIVE | COMMUNITY

## 2021-01-01 RX ORDER — LEVOTHYROXINE SODIUM 75 UG/1
TAKE 1 TABLET (75 MCG) BY ORAL ROUTE ONCE DAILY TABLET ORAL 1
Qty: 0 | Refills: 0 | Status: ACTIVE | COMMUNITY
Start: 1900-01-01

## 2021-01-01 RX ORDER — GLIMEPIRIDE 4 MG/1
TAKE 1 TABLET (4 MG) BY ORAL ROUTE ONCE DAILY TABLET ORAL 1
Qty: 0 | Refills: 0 | Status: ACTIVE | COMMUNITY
Start: 1900-01-01

## 2021-01-01 RX ORDER — GABAPENTIN 300 MG/1
TAKE 1 CAPSULE BY ORAL ROUTE DAILY CAPSULE ORAL ONCE A DAY
Qty: 30 CAPSULE | Refills: 2 | Status: ACTIVE | COMMUNITY

## 2021-01-01 RX ORDER — CIPROFLOXACIN HCL 500 MG
1 TABLET TABLET ORAL QD
Qty: 90 TABLET | Refills: 3 | Status: ACTIVE | COMMUNITY
Start: 2020-01-01 | End: 2021-06-10

## 2021-01-19 NOTE — HPI-TODAY'S VISIT:
This is a  follow-up appointment for this patient, a 65 year old /White male, after a previous visit last month for an evaluation of cirrhosis, elevated liver function tests, and anemia of chronic disease. A copy of this document will be sent to the referring provider. Recent liver function tests, AST, ALT, and, are elevated (see labs which were reviewed in the patients records ). The patient does have an established diagnosis of cirrhosis. They have been cirrhotic for 1 year. They report ascites, peripheral edema, esophageal varices, fatigue, and weakness. The ascites has been present for year. ascites had responsed to TIPS initially, but has worsened now. needing LVP every 2 weeks.  The edema is moderate-seems a little worse after TIPS. They have a history of varices which are located in the esophagus. They do not report a history of variceal bleeding. They have an EGD. The patient was seen by Dr. 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 He has had multiple blood transfusions and Fe infusion.  He has also seen Dr. Mcgee, Nephrologist and he is now on bumex prn. aldactone was stopped.  afraid it would make his creatinine worse. He is currently on Ferrous sulfate 65 mg twice daily. His stool is dark due to Fe supplement. The patient relates no significant family or personal history of liver disease. He states no history of new medications or alcohol use.  he was admitted to the hospital few months ago. was diagnosed with SBP while at the hospital. He is on cipro for SBP prophylaxis. stopped bactrim due to renal insufficiency. son is a heart transplant recepient. Given MDS diagnosis-not a candidate per [place] [facility].  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 needing periodic blood transfusions. MELD-11. has stopped nadolol-BP is much better after that.  needs wheelchair. falls down even with walker at times. feels better since starting on lactulose/xifaxan. creatinine worsens when diuretics are increased.  he is compliant with lactulose. on midodrine. gets periodic iron/blood transfusions. Had APC done of GAVE. recent hb was 7.1. being followed closely by . televisit today. does not want to consider hospice yet

## 2021-01-23 PROBLEM — 31031000119102: Status: ACTIVE | Noted: 2020-01-01

## 2021-01-28 PROBLEM — 266468003 CIRRHOSIS - NON-ALCOHOLIC: Status: ACTIVE | Noted: 2021-01-01

## 2021-01-28 NOTE — HPI-OTHER HISTORIES
Patient has CABRERA Cirrhosis, decompensated. TIPS status. Needs second opinion for treatment options, management. Dr. Graham Santos of [facility] recommended.

## 2021-02-10 PROBLEM — 389026000: Status: ACTIVE | Noted: 2020-01-01

## 2021-02-10 PROBLEM — 266468003: Status: ACTIVE | Noted: 2020-01-01

## 2021-02-10 PROBLEM — 19943007: Status: ACTIVE | Noted: 2020-01-01

## 2021-02-23 ENCOUNTER — OFFICE VISIT (OUTPATIENT)
Dept: URBAN - METROPOLITAN AREA TELEHEALTH 2 | Facility: TELEHEALTH | Age: 66
End: 2021-02-23

## 2023-05-10 NOTE — PHYSICAL EXAM NEUROLOGIC:
Bettie Gutierrez presents to clinic today at the request of Rajan Tucker MD  (ordering provider) for Allergy Immunotherapy injection(s).       This service provided today was under the care of Rajan Tucker MD ; the supervising provider of the day; who was available if needed.    Javier Shields RN   oriented to person, place and time , normal sensation , short and long term memory intact

## 2024-04-22 NOTE — PHYSICAL EXAM CARDIOVASCULAR:
Patient required O2 liter flow increase to 15 liters with CPAP when sleeping.  This writer observed apnea during sleep while on home CPAP.  Patient may need settings/equipment adjustments.   pedal edema noted, soft systolic murmur,  regular rate and rhythm